# Patient Record
Sex: FEMALE | NOT HISPANIC OR LATINO
[De-identification: names, ages, dates, MRNs, and addresses within clinical notes are randomized per-mention and may not be internally consistent; named-entity substitution may affect disease eponyms.]

---

## 2020-01-28 ENCOUNTER — FORM ENCOUNTER (OUTPATIENT)
Age: 70
End: 2020-01-28

## 2021-01-12 PROBLEM — H35.372 EPIRETINAL MEMBRANE: Noted: 2021-01-12

## 2021-01-12 PROBLEM — H40.013 GLAUCOMA SUSPECT, LOW RISK: Noted: 2021-01-12

## 2021-01-12 PROBLEM — H40.013 OPEN ANGLE WITH BORDERLINE GLAUCOMA FINDINGS, LOW RISK: Noted: 2021-01-12

## 2021-01-12 PROBLEM — H35.373 EPIRETINAL MEMBRANE: Noted: 2021-01-12

## 2021-01-12 PROBLEM — H40.013 GLAUCOMA SUSPECT, LOW RISK (OPTIC NERVE): Noted: 2021-01-12

## 2021-01-20 PROBLEM — Z00.00 ENCOUNTER FOR PREVENTIVE HEALTH EXAMINATION: Status: ACTIVE | Noted: 2021-01-20

## 2021-02-10 ENCOUNTER — APPOINTMENT (OUTPATIENT)
Dept: BREAST CENTER | Facility: CLINIC | Age: 71
End: 2021-02-10

## 2021-02-11 DIAGNOSIS — Z85.3 PERSONAL HISTORY OF MALIGNANT NEOPLASM OF BREAST: ICD-10-CM

## 2021-02-11 DIAGNOSIS — Z87.891 PERSONAL HISTORY OF NICOTINE DEPENDENCE: ICD-10-CM

## 2021-02-11 DIAGNOSIS — Z86.79 PERSONAL HISTORY OF OTHER DISEASES OF THE CIRCULATORY SYSTEM: ICD-10-CM

## 2021-02-12 ENCOUNTER — APPOINTMENT (OUTPATIENT)
Dept: BREAST CENTER | Facility: CLINIC | Age: 71
End: 2021-02-12
Payer: MEDICARE

## 2021-02-12 VITALS
HEIGHT: 59.84 IN | SYSTOLIC BLOOD PRESSURE: 160 MMHG | DIASTOLIC BLOOD PRESSURE: 80 MMHG | BODY MASS INDEX: 14.72 KG/M2 | WEIGHT: 75 LBS

## 2021-02-12 PROCEDURE — 99213 OFFICE O/P EST LOW 20 MIN: CPT

## 2021-02-12 NOTE — ASSESSMENT
[FreeTextEntry1] : The patient is a 71-year-old  postmenopausal female of [place] descent.  She underwent menarche at age 12 and had her first child at age 33.  She underwent menopause in her 50s and never took any hormone replacement therapy.  She has no family history of breast or ovarian cancer.  She was found to have a mass in the upper outer aspect of the right breast in  with suspicious calcifications on mammography.  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  The cancer was ER/DE strongly positive and she took Arimidex for 5 years and stopped in 2016.  She had an implant reconstruction.  On exam today, she has no evidence of recurrence over the right implant in her left breast is negative.  She underwent a recent left breast mammography and ultrasound at [place] on February 10, 2021 which showed no suspicious findings.  She can follow-up again in 1 year and will be due for her left breast mammography and ultrasound at that time around 2022.

## 2021-02-12 NOTE — PHYSICAL EXAM
[Normocephalic] : normocephalic [Atraumatic] : atraumatic [EOMI] : extra ocular movement intact [Supple] : supple [No Supraclavicular Adenopathy] : no supraclavicular adenopathy [No Cervical Adenopathy] : no cervical adenopathy [Normal Sinus Rhythm] : normal sinus rhythm [Examined in the supine and seated position] : examined in the supine and seated position [No dominant masses] : no dominant masses in right breast  [No dominant masses] : no dominant masses left breast [No Nipple Retraction] : no left nipple retraction [No Nipple Discharge] : no left nipple discharge [Breast Mass Right Breast ___cm] : no masses [Breast Mass Left Breast ___cm] : no masses [No Axillary Lymphadenopathy] : no left axillary lymphadenopathy [No Edema] : no edema [No Rashes] : no rashes [No Ulceration] : no ulceration [Asymmetrical] : asymmetrical [Breast Nipple Inversion Left] : nipple not inverted [Breast Nipple Retraction Left] : nipple not retracted [Breast Nipple Flattening Left] : nipple not flattened [Breast Nipple Fissures Left] : nipple not fissured [de-identified] : On exam she has an obvious implant reconstruction in the right breast but never had any nipple reconstruction or tattooing.  She has a small ptotic left A-cup breast.  She has no evidence of recurrence over the right implant and no findings in the left breast.  She has no axillary, supraclavicular, or cervical adenopathy. [de-identified] : Status postmastectomy and implant reconstruction

## 2021-02-12 NOTE — HISTORY OF PRESENT ILLNESS
[FreeTextEntry1] : The patient is a 71-year-old  postmenopausal female of Azeri descent.  She underwent menarche at age 12 and had her first child at age 33.  She underwent menopause in her 50s and never took any hormone replacement therapy.  She has no family history of breast or ovarian cancer.  She was found to have a mass in the upper outer aspect of the right breast in  with suspicious calcifications on mammography.  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  The cancer was ER/LA strongly positive and she took Arimidex for 5 years and stopped in 2016.  She had an implant reconstruction.  She comes in for routine follow-up and continues to get routine yearly left breast mammography and ultrasound.

## 2021-02-12 NOTE — REASON FOR VISIT
[Follow-Up: _____] : a [unfilled] follow-up visit [FreeTextEntry1] : The patient comes in for routine yearly follow-up with a history of undergoing a right breast modified radical mastectomy in November 2010 by Nitin Santillan for a mass forming DCIS in the upper outer quadrant measuring 3 cm with 9 negative nodes.  This was ER/OK strongly positive and she took Arimidex for 5 years.

## 2021-02-15 ENCOUNTER — TRANSCRIPTION ENCOUNTER (OUTPATIENT)
Age: 71
End: 2021-02-15

## 2021-08-24 ENCOUNTER — ESTABLISHED COMPREHENSIVE EXAM (OUTPATIENT)
Dept: URBAN - METROPOLITAN AREA CLINIC 92 | Facility: CLINIC | Age: 71
End: 2021-08-24

## 2021-08-24 DIAGNOSIS — H35.372: ICD-10-CM

## 2021-08-24 DIAGNOSIS — H40.013: ICD-10-CM

## 2021-08-24 PROCEDURE — 92012 INTRM OPH EXAM EST PATIENT: CPT

## 2021-08-24 ASSESSMENT — KERATOMETRY
OD_K2POWER_DIOPTERS: 47.75
OS_AXISANGLE2_DEGREES: 108
OS_K1POWER_DIOPTERS: 45.75
OS_K2POWER_DIOPTERS: 46.50
OS_AXISANGLE_DEGREES: 18
OD_K1POWER_DIOPTERS: 46.00
OD_AXISANGLE2_DEGREES: 81
OD_AXISANGLE_DEGREES: 171

## 2021-08-24 ASSESSMENT — VISUAL ACUITY
OD_SC: 20/30-2
OS_SC: 20/30

## 2021-08-24 ASSESSMENT — TONOMETRY
OD_IOP_MMHG: 14
OS_IOP_MMHG: 16

## 2021-10-21 ENCOUNTER — PROBLEM (OUTPATIENT)
Dept: URBAN - METROPOLITAN AREA CLINIC 92 | Facility: CLINIC | Age: 71
End: 2021-10-21

## 2021-10-21 DIAGNOSIS — H16.223: ICD-10-CM

## 2021-10-21 DIAGNOSIS — H11.822: ICD-10-CM

## 2021-10-21 PROCEDURE — 99214 OFFICE O/P EST MOD 30 MIN: CPT

## 2021-10-21 ASSESSMENT — VISUAL ACUITY
OS_SC: 20/30
OD_SC: 20/30-2

## 2021-10-21 ASSESSMENT — KERATOMETRY
OD_AXISANGLE_DEGREES: 171
OS_K1POWER_DIOPTERS: 45.75
OD_K2POWER_DIOPTERS: 47.75
OD_AXISANGLE2_DEGREES: 81
OS_AXISANGLE_DEGREES: 18
OS_K2POWER_DIOPTERS: 46.50
OS_AXISANGLE2_DEGREES: 108
OD_K1POWER_DIOPTERS: 46.00

## 2021-10-21 ASSESSMENT — TONOMETRY
OD_IOP_MMHG: 16
OS_IOP_MMHG: 16

## 2021-11-22 ASSESSMENT — KERATOMETRY
OS_AXISANGLE_DEGREES: 18
OD_AXISANGLE2_DEGREES: 81
OS_AXISANGLE2_DEGREES: 108
OS_K1POWER_DIOPTERS: 45.75
OS_K2POWER_DIOPTERS: 46.50
OD_K2POWER_DIOPTERS: 47.75
OD_K1POWER_DIOPTERS: 46.00
OD_AXISANGLE_DEGREES: 171

## 2021-11-24 ENCOUNTER — IOP CHECK (OUTPATIENT)
Dept: URBAN - METROPOLITAN AREA CLINIC 92 | Facility: CLINIC | Age: 71
End: 2021-11-24

## 2021-11-24 DIAGNOSIS — H40.013: ICD-10-CM

## 2021-11-24 PROCEDURE — 92083 EXTENDED VISUAL FIELD XM: CPT

## 2021-11-24 PROCEDURE — 92012 INTRM OPH EXAM EST PATIENT: CPT

## 2021-11-24 ASSESSMENT — TONOMETRY
OS_IOP_MMHG: 15
OD_IOP_MMHG: 15

## 2021-11-24 ASSESSMENT — VISUAL ACUITY
OD_SC: 20/30
OS_SC: 20/25+2

## 2021-12-04 ENCOUNTER — NON-APPOINTMENT (OUTPATIENT)
Age: 71
End: 2021-12-04

## 2022-02-16 ENCOUNTER — RESULT REVIEW (OUTPATIENT)
Age: 72
End: 2022-02-16

## 2022-02-16 ENCOUNTER — APPOINTMENT (OUTPATIENT)
Dept: BREAST CENTER | Facility: CLINIC | Age: 72
End: 2022-02-16
Payer: MEDICARE

## 2022-02-16 VITALS
HEART RATE: 91 BPM | WEIGHT: 75 LBS | BODY MASS INDEX: 15.12 KG/M2 | SYSTOLIC BLOOD PRESSURE: 142 MMHG | HEIGHT: 59 IN | DIASTOLIC BLOOD PRESSURE: 78 MMHG

## 2022-02-16 PROCEDURE — 99213 OFFICE O/P EST LOW 20 MIN: CPT

## 2022-02-16 NOTE — ASSESSMENT
[FreeTextEntry1] : The patient is a 72-year-old  postmenopausal female of Latvian descent.  She underwent menarche at age 12 and had her first child at age 33.  She underwent menopause in her 50s and never took any hormone replacement therapy.  She has no family history of breast or ovarian cancer.  She was found to have a mass in the upper outer aspect of the right breast in  with suspicious calcifications on mammography.  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  The cancer was ER/MS strongly positive and she took Arimidex for 5 years and stopped in 2016.  She had an implant reconstruction.  On exam today, she has no evidence of recurrence over the right implant and her left breast is negative.  She underwent her last left breast mammography and ultrasound which was reviewed from today on 2022 which showed no suspicious findings.  She was reassured and can follow-up again in 1 year and will be due for her left breast mammography and ultrasound at that time around 2023 and she was given prescriptions.

## 2022-02-16 NOTE — HISTORY OF PRESENT ILLNESS
[FreeTextEntry1] : The patient is a 72-year-old  postmenopausal female of Ukrainian descent.  She underwent menarche at age 12 and had her first child at age 33.  She underwent menopause in her 50s and never took any hormone replacement therapy.  She has no family history of breast or ovarian cancer.  She was found to have a mass in the upper outer aspect of the right breast in  with suspicious calcifications on mammography.  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  The cancer was ER/[place] strongly positive and she took Arimidex for 5 years and stopped in 2016.  She had an implant reconstruction.  She comes in for routine follow-up and continues to get routine yearly left breast mammography and ultrasound.

## 2022-02-16 NOTE — PHYSICAL EXAM
[Normocephalic] : normocephalic [Atraumatic] : atraumatic [EOMI] : extra ocular movement intact [Supple] : supple [No Supraclavicular Adenopathy] : no supraclavicular adenopathy [No Cervical Adenopathy] : no cervical adenopathy [Normal Sinus Rhythm] : normal sinus rhythm [Examined in the supine and seated position] : examined in the supine and seated position [Asymmetrical] : asymmetrical [No dominant masses] : no dominant masses in right breast  [No dominant masses] : no dominant masses left breast [No Nipple Retraction] : no left nipple retraction [No Nipple Discharge] : no left nipple discharge [Breast Mass Right Breast ___cm] : no masses [Breast Mass Left Breast ___cm] : no masses [No Axillary Lymphadenopathy] : no left axillary lymphadenopathy [No Edema] : no edema [No Rashes] : no rashes [No Ulceration] : no ulceration [Breast Nipple Inversion Left] : nipple not inverted [Breast Nipple Retraction Left] : nipple not retracted [Breast Nipple Flattening Left] : nipple not flattened [Breast Nipple Fissures Left] : nipple not fissured [Breast Abnormal Lactation (Galactorrhea) Left] : no galactorrhea [Breast Abnormal Secretion Bloody Fluid Left] : no bloody discharge [Breast Abnormal Secretion Serous Fluid Left] : no serous discharge [Breast Abnormal Secretion Opalescent Fluid Left] : no milky discharge [de-identified] : On exam she has an obvious implant reconstruction in the right breast but never had any nipple reconstruction or tattooing.  She has a small ptotic left A-cup breast.  She has no evidence of recurrence over the right implant and no findings in the left breast.  She has no axillary, supraclavicular, or cervical adenopathy. [de-identified] : Status postmastectomy and implant reconstruction with no evidence of recurrence

## 2022-02-16 NOTE — REASON FOR VISIT
[Follow-Up: _____] : a [unfilled] follow-up visit [FreeTextEntry1] : The patient comes in for routine yearly follow-up with a history of undergoing a right breast modified radical mastectomy in November 2010 by Nitin Santillan for a mass forming DCIS in the upper outer quadrant measuring 3 cm with 9 negative nodes.  This was ER/NC strongly positive and she took Arimidex for 5 years.

## 2022-05-11 ENCOUNTER — IOP CHECK (OUTPATIENT)
Dept: URBAN - METROPOLITAN AREA CLINIC 92 | Facility: CLINIC | Age: 72
End: 2022-05-11

## 2022-05-11 DIAGNOSIS — H40.013: ICD-10-CM

## 2022-05-11 DIAGNOSIS — H16.223: ICD-10-CM

## 2022-05-11 PROCEDURE — 92012 INTRM OPH EXAM EST PATIENT: CPT

## 2022-05-11 PROCEDURE — 92133 CPTRZD OPH DX IMG PST SGM ON: CPT

## 2022-05-11 ASSESSMENT — KERATOMETRY
OD_AXISANGLE_DEGREES: 167
OD_K1POWER_DIOPTERS: 45.50
OS_AXISANGLE2_DEGREES: 85
OD_AXISANGLE2_DEGREES: 77
OS_AXISANGLE_DEGREES: 175
OS_K2POWER_DIOPTERS: 46.75
OD_K2POWER_DIOPTERS: 47.25
OS_K1POWER_DIOPTERS: 46.00

## 2022-05-11 ASSESSMENT — TONOMETRY
OD_IOP_MMHG: 11
OD_IOP_MMHG: 16
OS_IOP_MMHG: 17
OS_IOP_MMHG: 12

## 2022-05-11 ASSESSMENT — VISUAL ACUITY
OS_SC: 20/20
OD_SC: 20/40

## 2022-11-02 ASSESSMENT — KERATOMETRY
OD_K2POWER_DIOPTERS: 47.25
OD_AXISANGLE2_DEGREES: 77
OS_AXISANGLE_DEGREES: 175
OS_K2POWER_DIOPTERS: 46.75
OD_AXISANGLE_DEGREES: 167
OS_K1POWER_DIOPTERS: 46.00
OS_AXISANGLE2_DEGREES: 85
OD_K1POWER_DIOPTERS: 45.50

## 2022-11-03 ENCOUNTER — EMERGENCY VISIT (OUTPATIENT)
Dept: URBAN - METROPOLITAN AREA CLINIC 92 | Facility: CLINIC | Age: 72
End: 2022-11-03

## 2022-11-03 DIAGNOSIS — H35.373: ICD-10-CM

## 2022-11-03 DIAGNOSIS — H16.223: ICD-10-CM

## 2022-11-03 DIAGNOSIS — H40.013: ICD-10-CM

## 2022-11-03 DIAGNOSIS — Z98.41: ICD-10-CM

## 2022-11-03 DIAGNOSIS — Z98.42: ICD-10-CM

## 2022-11-03 PROCEDURE — 92134 CPTRZ OPH DX IMG PST SGM RTA: CPT

## 2022-11-03 PROCEDURE — 92012 INTRM OPH EXAM EST PATIENT: CPT

## 2022-11-03 ASSESSMENT — TONOMETRY
OD_IOP_MMHG: 13
OS_IOP_MMHG: 14
OS_IOP_MMHG: 14
OD_IOP_MMHG: 12

## 2022-11-03 ASSESSMENT — VISUAL ACUITY
OD_SC: 20/40-1
OS_SC: 20/20-2

## 2023-02-22 NOTE — REASON FOR VISIT
[Follow-Up: _____] : a [unfilled] follow-up visit [FreeTextEntry1] : The patient comes in for routine yearly follow-up with a history of undergoing a right breast modified radical mastectomy in November 2010 by Nitin Santillan for a mass forming DCIS in the upper outer quadrant measuring 3 cm with 9 negative nodes.  This was ER/SD strongly positive and she took Arimidex for 5 years.

## 2023-02-22 NOTE — ASSESSMENT
[FreeTextEntry1] : The patient is a 73-year-old  postmenopausal female of Icelandic descent.  She underwent menarche at age 12 and had her first child at age 33.  She underwent menopause in her 50s and never took any hormone replacement therapy.  She has no family history of breast or ovarian cancer.  She was found to have a mass in the upper outer aspect of the right breast in  with suspicious calcifications on mammography.  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  The cancer was ER/OR strongly positive and she took Arimidex for 5 years and stopped in 2016.  She had an implant reconstruction.  On exam today, she has no evidence of recurrence over the right implant and her left breast is negative.  She underwent her last left breast mammography and ultrasound which was reviewed from today on ???????? 2022 which showed no suspicious findings.  She was reassured and can follow-up again in 1 year and will be due for her next left breast mammography and ultrasound at that time around 2024 and she was given prescriptions.

## 2023-02-22 NOTE — HISTORY OF PRESENT ILLNESS
[FreeTextEntry1] : The patient is a 73-year-old  postmenopausal female of Lithuanian descent.  She underwent menarche at age 12 and had her first child at age 33.  She underwent menopause in her 50s and never took any hormone replacement therapy.  She has no family history of breast or ovarian cancer.  She was found to have a mass in the upper outer aspect of the right breast in  with suspicious calcifications on mammography.  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  The cancer was ER/[place] strongly positive and she took Arimidex for 5 years and stopped in 2016.  She had an implant reconstruction.  She comes in for routine follow-up and continues to get routine yearly left breast mammography and ultrasound.

## 2023-02-22 NOTE — PHYSICAL EXAM
[Normocephalic] : normocephalic [Atraumatic] : atraumatic [EOMI] : extra ocular movement intact [Supple] : supple [No Supraclavicular Adenopathy] : no supraclavicular adenopathy [No Cervical Adenopathy] : no cervical adenopathy [Normal Sinus Rhythm] : normal sinus rhythm [Examined in the supine and seated position] : examined in the supine and seated position [Asymmetrical] : asymmetrical [No dominant masses] : no dominant masses in right breast  [No dominant masses] : no dominant masses left breast [No Nipple Retraction] : no left nipple retraction [No Nipple Discharge] : no left nipple discharge [Breast Mass Right Breast ___cm] : no masses [Breast Nipple Inversion Left] : nipple not inverted [Breast Nipple Retraction Left] : nipple not retracted [Breast Nipple Flattening Left] : nipple not flattened [Breast Nipple Fissures Left] : nipple not fissured [Breast Abnormal Lactation (Galactorrhea) Left] : no galactorrhea [Breast Abnormal Secretion Bloody Fluid Left] : no bloody discharge [Breast Abnormal Secretion Opalescent Fluid Left] : no milky discharge [Breast Abnormal Secretion Serous Fluid Left] : no serous discharge [Breast Mass Left Breast ___cm] : no masses [No Axillary Lymphadenopathy] : no left axillary lymphadenopathy [No Edema] : no edema [No Rashes] : no rashes [No Ulceration] : no ulceration [de-identified] : On exam she has an obvious implant reconstruction in the right breast but never had any nipple reconstruction or tattooing.  She has a small ptotic left A-cup breast.  She has no evidence of recurrence over the right implant and no findings in the left breast.  She has no axillary, supraclavicular, or cervical adenopathy. [de-identified] : Status postmastectomy and implant reconstruction with no evidence of recurrence

## 2023-03-01 ENCOUNTER — APPOINTMENT (OUTPATIENT)
Dept: BREAST CENTER | Facility: CLINIC | Age: 73
End: 2023-03-01

## 2023-03-15 ENCOUNTER — RESULT REVIEW (OUTPATIENT)
Age: 73
End: 2023-03-15

## 2023-03-15 ENCOUNTER — APPOINTMENT (OUTPATIENT)
Dept: BREAST CENTER | Facility: CLINIC | Age: 73
End: 2023-03-15
Payer: MEDICARE

## 2023-03-15 VITALS
WEIGHT: 75 LBS | DIASTOLIC BLOOD PRESSURE: 83 MMHG | OXYGEN SATURATION: 95 % | SYSTOLIC BLOOD PRESSURE: 140 MMHG | TEMPERATURE: 97.2 F | BODY MASS INDEX: 15.12 KG/M2 | HEART RATE: 83 BPM | HEIGHT: 59 IN

## 2023-03-15 PROCEDURE — 99213 OFFICE O/P EST LOW 20 MIN: CPT

## 2023-03-15 NOTE — REASON FOR VISIT
[Follow-Up: _____] : a [unfilled] follow-up visit [FreeTextEntry1] : The patient comes in for routine yearly follow-up with a history of undergoing a right breast modified radical mastectomy in November 2010 by Nitin Santillan for a mass forming DCIS in the upper outer quadrant measuring 3 cm with 9 negative nodes.  This was ER/[place] strongly positive and she took Arimidex for 5 years.

## 2023-03-15 NOTE — ASSESSMENT
[FreeTextEntry1] : The patient is a 73-year-old  postmenopausal female of Faro[place] descent.  She underwent menarche at age 12 and had her first child at age 33.  She underwent menopause in her 50s and never took any hormone replacement therapy.  She has no family history of breast or ovarian cancer.  She was found to have a mass in the upper outer aspect of the right breast in  with suspicious calcifications on mammography.  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  The cancer was ER/OK strongly positive and she took Arimidex for 5 years and stopped in 2016.  She had an implant reconstruction.  On exam today, she has no evidence of recurrence over the right implant and her left breast is negative.  She underwent her last left breast mammography and ultrasound which was reviewed from today on March 15, 2023 which showed no suspicious findings.  She was reassured and can follow-up again in 1 year and will be due for her next left breast mammography and ultrasound at that time around 2024 and she was given prescriptions.

## 2023-03-15 NOTE — HISTORY OF PRESENT ILLNESS
[FreeTextEntry1] : The patient is a 73-year-old  postmenopausal female of French descent.  She underwent menarche at age 12 and had her first child at age 33.  She underwent menopause in her 50s and never took any hormone replacement therapy.  She has no family history of breast or ovarian cancer.  She was found to have a mass in the upper outer aspect of the right breast in  with suspicious calcifications on mammography.  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  The cancer was ER/SC strongly positive and she took Arimidex for 5 years and stopped in 2016.  She had an implant reconstruction.  She comes in for routine follow-up and continues to get routine yearly left breast mammography and ultrasound.

## 2023-03-15 NOTE — PHYSICAL EXAM
[Normocephalic] : normocephalic [Atraumatic] : atraumatic [EOMI] : extra ocular movement intact [Supple] : supple [No Supraclavicular Adenopathy] : no supraclavicular adenopathy [No Cervical Adenopathy] : no cervical adenopathy [Normal Sinus Rhythm] : normal sinus rhythm [Examined in the supine and seated position] : examined in the supine and seated position [Asymmetrical] : asymmetrical [No dominant masses] : no dominant masses in right breast  [No dominant masses] : no dominant masses left breast [No Nipple Retraction] : no left nipple retraction [No Nipple Discharge] : no left nipple discharge [Breast Mass Right Breast ___cm] : no masses [Breast Mass Left Breast ___cm] : no masses [No Axillary Lymphadenopathy] : no left axillary lymphadenopathy [No Edema] : no edema [No Rashes] : no rashes [No Ulceration] : no ulceration [Breast Nipple Inversion Left] : nipple not inverted [Breast Nipple Retraction Left] : nipple not retracted [Breast Nipple Flattening Left] : nipple not flattened [Breast Nipple Fissures Left] : nipple not fissured [Breast Abnormal Lactation (Galactorrhea) Left] : no galactorrhea [Breast Abnormal Secretion Bloody Fluid Left] : no bloody discharge [Breast Abnormal Secretion Serous Fluid Left] : no serous discharge [Breast Abnormal Secretion Opalescent Fluid Left] : no milky discharge [de-identified] : On exam she has an obvious implant reconstruction in the right breast but never had any nipple reconstruction or tattooing.  She has a small ptotic left A-cup breast.  She has no evidence of recurrence over the right implant and no findings in the left breast.  She has no axillary, supraclavicular, or cervical adenopathy. [de-identified] : Status postmastectomy and implant reconstruction with no evidence of recurrence

## 2023-05-12 ENCOUNTER — ESTABLISHED COMPREHENSIVE EXAM (OUTPATIENT)
Dept: URBAN - METROPOLITAN AREA CLINIC 92 | Facility: CLINIC | Age: 73
End: 2023-05-12

## 2023-05-12 DIAGNOSIS — Z98.890: ICD-10-CM

## 2023-05-12 DIAGNOSIS — Z98.42: ICD-10-CM

## 2023-05-12 DIAGNOSIS — H40.013: ICD-10-CM

## 2023-05-12 DIAGNOSIS — H16.223: ICD-10-CM

## 2023-05-12 DIAGNOSIS — H35.372: ICD-10-CM

## 2023-05-12 DIAGNOSIS — Z98.41: ICD-10-CM

## 2023-05-12 PROCEDURE — 92014 COMPRE OPH EXAM EST PT 1/>: CPT

## 2023-05-12 PROCEDURE — 92015 DETERMINE REFRACTIVE STATE: CPT

## 2023-05-12 PROCEDURE — 92250 FUNDUS PHOTOGRAPHY W/I&R: CPT

## 2023-05-12 ASSESSMENT — KERATOMETRY
OD_K1POWER_DIOPTERS: 46.25
OS_AXISANGLE2_DEGREES: 85
OD_K2POWER_DIOPTERS: 47.25
OS_K2POWER_DIOPTERS: 47.25
OD_AXISANGLE_DEGREES: 165
OD_K2POWER_DIOPTERS: 48.00
OD_K1POWER_DIOPTERS: 45.50
OD_AXISANGLE2_DEGREES: 75
OD_AXISANGLE_DEGREES: 167
OS_AXISANGLE2_DEGREES: 100
OS_AXISANGLE_DEGREES: 10
OS_K1POWER_DIOPTERS: 46.00
OS_K2POWER_DIOPTERS: 46.75
OS_AXISANGLE_DEGREES: 175
OD_AXISANGLE2_DEGREES: 77

## 2023-05-12 ASSESSMENT — VISUAL ACUITY
OS_SC: 20/20-2
OD_SC: 20/40

## 2023-05-12 ASSESSMENT — TONOMETRY
OS_IOP_MMHG: 13
OD_IOP_MMHG: 13

## 2023-10-13 ENCOUNTER — EMERGENCY VISIT (OUTPATIENT)
Dept: URBAN - METROPOLITAN AREA CLINIC 92 | Facility: CLINIC | Age: 73
End: 2023-10-13

## 2023-10-13 DIAGNOSIS — H16.223: ICD-10-CM

## 2023-10-13 DIAGNOSIS — H35.372: ICD-10-CM

## 2023-10-13 DIAGNOSIS — Z98.42: ICD-10-CM

## 2023-10-13 DIAGNOSIS — Z98.41: ICD-10-CM

## 2023-10-13 PROCEDURE — 92012 INTRM OPH EXAM EST PATIENT: CPT

## 2023-10-13 ASSESSMENT — TONOMETRY
OS_IOP_MMHG: 12
OD_IOP_MMHG: 10

## 2023-10-13 ASSESSMENT — KERATOMETRY
OD_AXISANGLE2_DEGREES: 77
OD_AXISANGLE_DEGREES: 165
OS_K2POWER_DIOPTERS: 46.75
OD_AXISANGLE2_DEGREES: 75
OS_AXISANGLE_DEGREES: 10
OD_K2POWER_DIOPTERS: 47.25
OS_K1POWER_DIOPTERS: 46.00
OS_AXISANGLE2_DEGREES: 100
OD_K2POWER_DIOPTERS: 48.00
OS_AXISANGLE2_DEGREES: 85
OD_K1POWER_DIOPTERS: 45.50
OS_K2POWER_DIOPTERS: 47.25
OS_AXISANGLE_DEGREES: 175
OD_K1POWER_DIOPTERS: 46.25
OD_AXISANGLE_DEGREES: 167

## 2023-10-13 ASSESSMENT — VISUAL ACUITY
OD_SC: 20/50+2
OS_SC: 20/20-2

## 2024-02-28 ENCOUNTER — NON-APPOINTMENT (OUTPATIENT)
Age: 74
End: 2024-02-28

## 2024-03-13 NOTE — ASSESSMENT
[FreeTextEntry1] : The patient is a 74-year-old  postmenopausal female of Portuguese descent.  She underwent menarche at age 12 and had her first child at age 33.  She underwent menopause in her 50s and never took any hormone replacement therapy.  She has no family history of breast or ovarian cancer.  She was found to have a mass in the upper outer aspect of the right breast in  with suspicious calcifications on mammography.  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  The cancer was ER/[place] strongly positive and she took Arimidex for 5 years and stopped in 2016.  She had an implant reconstruction.  On exam today, she has no evidence of recurrence over the right implant and her left breast is negative.  She underwent her last left breast mammography and ultrasound which was reviewed from today on?????? March 15, 2023 which showed no suspicious findings.  She was reassured and can follow-up again in 1 year and will be due for her next left breast mammography and ultrasound at that time around ??????? 2025 and she was given prescriptions.

## 2024-03-13 NOTE — REASON FOR VISIT
[Follow-Up: _____] : a [unfilled] follow-up visit [FreeTextEntry1] : The patient comes in for routine yearly follow-up with a history of undergoing a right breast modified radical mastectomy in November 2010 by Nitin Santillan for a mass forming DCIS in the upper outer quadrant measuring 3 cm with 9 negative nodes.  This was ER/NM strongly positive and she took Arimidex for 5 years.

## 2024-03-13 NOTE — HISTORY OF PRESENT ILLNESS
[FreeTextEntry1] : The patient is a 74-year-old  postmenopausal female of [place] descent.  She underwent menarche at age 12 and had her first child at age 33.  She underwent menopause in her 50s and never took any hormone replacement therapy.  She has no family history of breast or ovarian cancer.  She was found to have a mass in the upper outer aspect of the right breast in  with suspicious calcifications on mammography.  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  The cancer was ER/NE strongly positive and she took Arimidex for 5 years and stopped in 2016.  She had an implant reconstruction.  She comes in for routine follow-up and continues to get routine yearly left breast mammography and ultrasound.

## 2024-03-13 NOTE — PAST MEDICAL HISTORY
[Postmenopausal] : The patient is postmenopausal [Menarche Age ____] : age at menarche was [unfilled] [Menopause Age____] : age at menopause was [unfilled] [Total Preg ___] : G[unfilled] [Age At Live Birth ___] : Age at live birth: [unfilled] [Live Births ___] : P[unfilled]  [History of Hormone Replacement Treatment] : has no history of hormone replacement treatment

## 2024-03-13 NOTE — PHYSICAL EXAM
[Normocephalic] : normocephalic [Atraumatic] : atraumatic [EOMI] : extra ocular movement intact [Supple] : supple [No Supraclavicular Adenopathy] : no supraclavicular adenopathy [No Cervical Adenopathy] : no cervical adenopathy [Normal Sinus Rhythm] : normal sinus rhythm [Examined in the supine and seated position] : examined in the supine and seated position [Asymmetrical] : asymmetrical [No dominant masses] : no dominant masses in right breast  [No Nipple Retraction] : no left nipple retraction [No dominant masses] : no dominant masses left breast [No Nipple Discharge] : no left nipple discharge [Breast Mass Right Breast ___cm] : no masses [Breast Mass Left Breast ___cm] : no masses [No Axillary Lymphadenopathy] : no left axillary lymphadenopathy [No Edema] : no edema [No Rashes] : no rashes [No Ulceration] : no ulceration [Breast Nipple Inversion Left] : nipple not inverted [Breast Nipple Retraction Left] : nipple not retracted [Breast Nipple Flattening Left] : nipple not flattened [Breast Nipple Fissures Left] : nipple not fissured [Breast Abnormal Lactation (Galactorrhea) Left] : no galactorrhea [Breast Abnormal Secretion Bloody Fluid Left] : no bloody discharge [Breast Abnormal Secretion Serous Fluid Left] : no serous discharge [Breast Abnormal Secretion Opalescent Fluid Left] : no milky discharge [de-identified] : On exam she has an obvious implant reconstruction in the right breast but never had any nipple reconstruction or tattooing.  She has a small ptotic left A-cup breast.  She has no evidence of recurrence over the right implant and no findings in the left breast.  She has no axillary, supraclavicular, or cervical adenopathy. [de-identified] : Status postmastectomy and implant reconstruction with no evidence of recurrence

## 2024-03-19 ENCOUNTER — EMERGENCY VISIT (OUTPATIENT)
Dept: URBAN - METROPOLITAN AREA CLINIC 92 | Facility: CLINIC | Age: 74
End: 2024-03-19

## 2024-03-19 DIAGNOSIS — H02.883: ICD-10-CM

## 2024-03-19 PROCEDURE — 99213 OFFICE O/P EST LOW 20 MIN: CPT

## 2024-03-19 ASSESSMENT — KERATOMETRY
OS_AXISANGLE_DEGREES: 10
OS_AXISANGLE_DEGREES: 175
OD_K2POWER_DIOPTERS: 48.00
OS_K2POWER_DIOPTERS: 47.25
OS_AXISANGLE2_DEGREES: 100
OS_K1POWER_DIOPTERS: 46.00
OD_AXISANGLE_DEGREES: 167
OD_K1POWER_DIOPTERS: 45.50
OD_AXISANGLE_DEGREES: 165
OD_AXISANGLE2_DEGREES: 77
OD_AXISANGLE2_DEGREES: 75
OS_AXISANGLE2_DEGREES: 85
OD_K1POWER_DIOPTERS: 46.25
OD_K2POWER_DIOPTERS: 47.25
OS_K2POWER_DIOPTERS: 46.75

## 2024-03-19 ASSESSMENT — VISUAL ACUITY
OS_SC: 20/20-1
OD_SC: 20/40-1

## 2024-03-19 ASSESSMENT — TONOMETRY
OS_IOP_MMHG: 13
OD_IOP_MMHG: 14

## 2024-03-20 ENCOUNTER — APPOINTMENT (OUTPATIENT)
Dept: BREAST CENTER | Facility: CLINIC | Age: 74
End: 2024-03-20
Payer: MEDICARE

## 2024-03-20 ENCOUNTER — RESULT REVIEW (OUTPATIENT)
Age: 74
End: 2024-03-20

## 2024-03-20 VITALS
HEIGHT: 63 IN | HEART RATE: 86 BPM | DIASTOLIC BLOOD PRESSURE: 87 MMHG | OXYGEN SATURATION: 97 % | WEIGHT: 75 LBS | SYSTOLIC BLOOD PRESSURE: 150 MMHG | BODY MASS INDEX: 13.29 KG/M2

## 2024-03-20 DIAGNOSIS — R92.30 DENSE BREASTS, UNSPECIFIED: ICD-10-CM

## 2024-03-20 DIAGNOSIS — Z85.3 PERSONAL HISTORY OF MALIGNANT NEOPLASM OF BREAST: ICD-10-CM

## 2024-03-20 DIAGNOSIS — Z12.39 ENCOUNTER FOR OTHER SCREENING FOR MALIGNANT NEOPLASM OF BREAST: ICD-10-CM

## 2024-03-20 PROCEDURE — 99212 OFFICE O/P EST SF 10 MIN: CPT

## 2024-03-20 PROCEDURE — G2211 COMPLEX E/M VISIT ADD ON: CPT

## 2024-03-20 NOTE — PHYSICAL EXAM
[Normocephalic] : normocephalic [Atraumatic] : atraumatic [EOMI] : extra ocular movement intact [Supple] : supple [No Supraclavicular Adenopathy] : no supraclavicular adenopathy [No Cervical Adenopathy] : no cervical adenopathy [Normal Sinus Rhythm] : normal sinus rhythm [Examined in the supine and seated position] : examined in the supine and seated position [Asymmetrical] : asymmetrical [No dominant masses] : no dominant masses left breast [No dominant masses] : no dominant masses in right breast  [No Nipple Discharge] : no left nipple discharge [No Nipple Retraction] : no left nipple retraction [Breast Mass Right Breast ___cm] : no masses [Breast Mass Left Breast ___cm] : no masses [No Axillary Lymphadenopathy] : no left axillary lymphadenopathy [No Rashes] : no rashes [No Edema] : no edema [No Ulceration] : no ulceration [Breast Nipple Inversion Left] : nipple not inverted [Breast Nipple Retraction Left] : nipple not retracted [Breast Nipple Flattening Left] : nipple not flattened [Breast Nipple Fissures Left] : nipple not fissured [Breast Abnormal Lactation (Galactorrhea) Left] : no galactorrhea [Breast Abnormal Secretion Serous Fluid Left] : no serous discharge [Breast Abnormal Secretion Bloody Fluid Left] : no bloody discharge [Breast Abnormal Secretion Opalescent Fluid Left] : no milky discharge [de-identified] : On exam she has an obvious implant reconstruction in the right breast but never had any nipple reconstruction or tattooing.  She has a small ptotic left A-cup breast.  She has no evidence of recurrence over the right implant and no findings in the left breast.  She has no axillary, supraclavicular, or cervical adenopathy. [de-identified] : Status postmastectomy and implant reconstruction with no evidence of recurrence

## 2024-03-20 NOTE — HISTORY OF PRESENT ILLNESS
[FreeTextEntry1] : The patient is a 74-year-old  postmenopausal female of [place] descent.  She underwent menarche at age 12 and had her first child at age 33.  She underwent menopause in her 50s and never took any hormone replacement therapy.  She has no family history of breast or ovarian cancer.  She was found to have a mass in the upper outer aspect of the right breast in  with suspicious calcifications on mammography.  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  The cancer was ER/RI strongly positive and she took Arimidex for 5 years and stopped in 2016.  She had an implant reconstruction.  She comes in for routine follow-up and continues to get routine yearly left breast mammography and ultrasound.

## 2024-03-20 NOTE — ASSESSMENT
[FreeTextEntry1] : The patient is a 74-year-old  postmenopausal female of [place] descent.  She underwent menarche at age 12 and had her first child at age 33.  She underwent menopause in her 50s and never took any hormone replacement therapy.  She has no family history of breast or ovarian cancer.  She was found to have a mass in the upper outer aspect of the right breast in  with suspicious calcifications on mammography.  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  The cancer was ER/CO strongly positive and she took Arimidex for 5 years and stopped in 2016.  She had an implant reconstruction.  On exam today, she has no evidence of recurrence over the right implant and her left breast is negative.  She underwent her last left breast mammography and ultrasound which was reviewed from today on 2024 from [facility] which showed no suspicious findings.  She was reassured and can follow-up again in 1 year and will be due for her next left breast mammography and ultrasound at that time around 2025 and she was given prescriptions.

## 2024-03-20 NOTE — REASON FOR VISIT
[Follow-Up: _____] : a [unfilled] follow-up visit [FreeTextEntry1] : The patient comes in for routine yearly follow-up with a history of undergoing a right breast modified radical mastectomy in November 2010 by Taurus Chau for a mass forming DCIS in the upper outer quadrant measuring 3 cm with 9 negative nodes.  This was ER/IA strongly positive and she took Arimidex for 5 years.

## 2024-05-16 ENCOUNTER — ESTABLISHED COMPREHENSIVE EXAM (OUTPATIENT)
Dept: URBAN - METROPOLITAN AREA CLINIC 92 | Facility: CLINIC | Age: 74
End: 2024-05-16

## 2024-05-16 DIAGNOSIS — H35.372: ICD-10-CM

## 2024-05-16 DIAGNOSIS — H16.223: ICD-10-CM

## 2024-05-16 DIAGNOSIS — Z98.42: ICD-10-CM

## 2024-05-16 DIAGNOSIS — Z98.41: ICD-10-CM

## 2024-05-16 DIAGNOSIS — Z98.890: ICD-10-CM

## 2024-05-16 DIAGNOSIS — H40.013: ICD-10-CM

## 2024-05-16 PROCEDURE — 92014 COMPRE OPH EXAM EST PT 1/>: CPT

## 2024-05-16 PROCEDURE — 92250 FUNDUS PHOTOGRAPHY W/I&R: CPT

## 2024-05-16 ASSESSMENT — KERATOMETRY
OD_K2POWER_DIOPTERS: 47.25
OS_K2POWER_DIOPTERS: 46.75
OD_K2POWER_DIOPTERS: 48.00
OD_AXISANGLE_DEGREES: 165
OS_AXISANGLE_DEGREES: 175
OS_AXISANGLE_DEGREES: 10
OS_AXISANGLE2_DEGREES: 105
OD_K1POWER_DIOPTERS: 45.50
OD_AXISANGLE2_DEGREES: 77
OS_AXISANGLE2_DEGREES: 100
OD_AXISANGLE2_DEGREES: 75
OS_K2POWER_DIOPTERS: 47.25
OS_AXISANGLE2_DEGREES: 85
OD_AXISANGLE_DEGREES: 167
OS_K1POWER_DIOPTERS: 46.00
OD_K2POWER_DIOPTERS: 48.25
OD_AXISANGLE_DEGREES: 160
OS_AXISANGLE_DEGREES: 15
OD_K1POWER_DIOPTERS: 46.25
OD_AXISANGLE2_DEGREES: 70

## 2024-05-16 ASSESSMENT — VISUAL ACUITY
OS_SC: 20/20-2
OD_SC: 20/50

## 2024-05-16 ASSESSMENT — TONOMETRY
OD_IOP_MMHG: 10
OS_IOP_MMHG: 10

## 2025-02-25 ENCOUNTER — NON-APPOINTMENT (OUTPATIENT)
Age: 75
End: 2025-02-25

## 2025-03-27 DIAGNOSIS — Z12.31 ENCOUNTER FOR SCREENING MAMMOGRAM FOR MALIGNANT NEOPLASM OF BREAST: ICD-10-CM

## 2025-03-28 ENCOUNTER — APPOINTMENT (OUTPATIENT)
Dept: BREAST CENTER | Facility: CLINIC | Age: 75
End: 2025-03-28
Payer: MEDICARE

## 2025-03-28 ENCOUNTER — RESULT REVIEW (OUTPATIENT)
Age: 75
End: 2025-03-28

## 2025-03-28 DIAGNOSIS — Z12.39 ENCOUNTER FOR OTHER SCREENING FOR MALIGNANT NEOPLASM OF BREAST: ICD-10-CM

## 2025-03-28 DIAGNOSIS — R92.30 DENSE BREASTS, UNSPECIFIED: ICD-10-CM

## 2025-03-28 DIAGNOSIS — Z85.3 PERSONAL HISTORY OF MALIGNANT NEOPLASM OF BREAST: ICD-10-CM

## 2025-03-28 PROCEDURE — G2211 COMPLEX E/M VISIT ADD ON: CPT

## 2025-03-28 PROCEDURE — 99213 OFFICE O/P EST LOW 20 MIN: CPT

## (undated) RX ORDER — CYCLOSPORINE 0.5 MG/ML: 1 EMULSION OPHTHALMIC TWICE A DAY

## (undated) RX ORDER — NEOMYCIN SULFATE, POLYMYXIN B SULFATE AND DEXAMETHASONE SUSPENSION/ DROPS 1; 3.5; 1 MG/ML; MG/ML; [USP'U]/ML: 1 SUSPENSION/ DROPS TOPICAL